# Patient Record
Sex: FEMALE | Race: WHITE | NOT HISPANIC OR LATINO | Employment: OTHER | URBAN - NONMETROPOLITAN AREA
[De-identification: names, ages, dates, MRNs, and addresses within clinical notes are randomized per-mention and may not be internally consistent; named-entity substitution may affect disease eponyms.]

---

## 2022-08-24 ENCOUNTER — OFFICE VISIT (OUTPATIENT)
Dept: CARDIOLOGY | Facility: CLINIC | Age: 67
End: 2022-08-24

## 2022-08-24 VITALS
OXYGEN SATURATION: 95 % | WEIGHT: 223 LBS | HEART RATE: 70 BPM | TEMPERATURE: 97.5 F | BODY MASS INDEX: 43.78 KG/M2 | DIASTOLIC BLOOD PRESSURE: 83 MMHG | SYSTOLIC BLOOD PRESSURE: 164 MMHG | HEIGHT: 60 IN

## 2022-08-24 DIAGNOSIS — R06.02 SHORTNESS OF BREATH: ICD-10-CM

## 2022-08-24 DIAGNOSIS — Z82.49 FAMILY HISTORY OF EARLY CAD: ICD-10-CM

## 2022-08-24 DIAGNOSIS — I10 PRIMARY HYPERTENSION: ICD-10-CM

## 2022-08-24 DIAGNOSIS — E78.5 HYPERLIPIDEMIA, UNSPECIFIED HYPERLIPIDEMIA TYPE: ICD-10-CM

## 2022-08-24 DIAGNOSIS — R07.89 OTHER CHEST PAIN: Primary | ICD-10-CM

## 2022-08-24 DIAGNOSIS — Z86.16 HISTORY OF COVID-19: ICD-10-CM

## 2022-08-24 PROBLEM — F41.9 ANXIETY: Status: ACTIVE | Noted: 2022-08-24

## 2022-08-24 PROBLEM — J30.2 SEASONAL ALLERGIES: Status: ACTIVE | Noted: 2022-08-24

## 2022-08-24 PROBLEM — K21.9 GERD (GASTROESOPHAGEAL REFLUX DISEASE): Status: ACTIVE | Noted: 2022-08-24

## 2022-08-24 PROBLEM — E11.9 DM (DIABETES MELLITUS), TYPE 2: Status: ACTIVE | Noted: 2022-08-24

## 2022-08-24 PROBLEM — J45.909 ASTHMA: Status: ACTIVE | Noted: 2022-08-24

## 2022-08-24 PROCEDURE — 99204 OFFICE O/P NEW MOD 45 MIN: CPT | Performed by: NURSE PRACTITIONER

## 2022-08-24 PROCEDURE — 93000 ELECTROCARDIOGRAM COMPLETE: CPT | Performed by: NURSE PRACTITIONER

## 2022-08-24 RX ORDER — HYDROCHLOROTHIAZIDE 25 MG/1
25 TABLET ORAL EVERY MORNING
COMMUNITY
Start: 2022-08-19

## 2022-08-24 RX ORDER — ATORVASTATIN CALCIUM 10 MG/1
10 TABLET, FILM COATED ORAL DAILY
COMMUNITY
Start: 2022-08-19

## 2022-08-24 RX ORDER — LEVOTHYROXINE SODIUM 0.03 MG/1
25 TABLET ORAL DAILY
COMMUNITY
Start: 2022-08-19

## 2022-08-24 RX ORDER — DAPAGLIFLOZIN 5 MG/1
5 TABLET, FILM COATED ORAL DAILY
COMMUNITY
Start: 2022-07-07 | End: 2023-01-18

## 2022-08-24 RX ORDER — OMEPRAZOLE 40 MG/1
40 CAPSULE, DELAYED RELEASE ORAL DAILY
COMMUNITY
Start: 2022-08-19

## 2022-08-24 RX ORDER — OXYBUTYNIN CHLORIDE 15 MG/1
15 TABLET, EXTENDED RELEASE ORAL DAILY
COMMUNITY
Start: 2022-05-26

## 2022-08-24 RX ORDER — BUSPIRONE HYDROCHLORIDE 10 MG/1
10 TABLET ORAL 2 TIMES DAILY
COMMUNITY
Start: 2022-08-19

## 2022-08-24 RX ORDER — PROPRANOLOL HYDROCHLORIDE 60 MG/1
120 TABLET ORAL DAILY
COMMUNITY
Start: 2022-08-19

## 2022-08-24 RX ORDER — ERGOCALCIFEROL 1.25 MG/1
50000 CAPSULE ORAL WEEKLY
COMMUNITY
Start: 2022-08-20

## 2022-08-24 RX ORDER — NITROGLYCERIN 0.4 MG/1
TABLET SUBLINGUAL
Qty: 30 TABLET | Refills: 5 | Status: SHIPPED | OUTPATIENT
Start: 2022-08-24

## 2022-08-24 NOTE — PROGRESS NOTES
Subjective   Lacey Lui is a 67 y.o. female     Chief Complaint   Patient presents with   • Hypertension   • Hyperlipidemia   • Chest Pain   • Establish Care       HPI    Problem List:    1. Chest Pain  2. Hyepertension  3. Hyperlipidemia   4. Shortness of breath  5. Diabetes mellitus II   6. Hypothyroidism   7. History of COVID-19 2021  8. Family history of early CAD, Mom 59    Patient is a 67-year-old female who presents today to establish care with her  at her side.  Patient says she had a couple of twinges about a month ago.  She says one was in the middle of her chest that was on the left anterior.  She states that came and went quickly and she had no other symptoms.  She said it did not radiate and she has not had any other episodes.  She denies any palpitations, fluttering, dizziness, presyncope, syncope, orthopnea or PND.  She does get swelling and she says usually her hydrochlorothiazide does help but she has not taken it yet today.  She does have shortness of breath but it really just depends on what she is doing.  She did have a history of COVID last September.  Patient feels like her chest pain is related to stress.  She says her daughter is getting , they have to move and currently they are also caring for their 14 and 16-year-old grandkids.    Occupation, cleans houses  She is never smoked, drink alcohol or do illicit drugs  She drinks at least 2 Cokes zeros a day and sometimes more; no coffee or tea    Mom 59 -MI, nondiabetic, non-smoker  Dad 86 -CHF, melanoma that mets and became septic   Sister 50s alive-MS, heart problems, leaky valve, diabetic  Brother 75 alive-stroke x2    We discussed having a stress test but patient does not feel like this is cardiac in nature.  She says she did see Dr. Valentine about 10 years ago and he told her was stress related.  She is willing to at least do a echo at this time.    We went over her labs.  Her creatinine was 0.98,  K4.1, A1c 6.8, LDL 67, triglycerides 169, mag 1.7 and TSH was normal.  Current Outpatient Medications on File Prior to Visit   Medication Sig Dispense Refill   • atorvastatin (LIPITOR) 10 MG tablet Take 10 mg by mouth Daily.     • busPIRone (BUSPAR) 10 MG tablet Take 10 mg by mouth 2 (Two) Times a Day.     • Farxiga 5 MG tablet tablet Take 5 mg by mouth Daily.     • Homeopathic Products (AZO YEAST PLUS PO) Take 1 tablet by mouth 3 (Three) Times a Day.     • hydroCHLOROthiazide (HYDRODIURIL) 25 MG tablet Take 25 mg by mouth Every Morning.     • levothyroxine (SYNTHROID, LEVOTHROID) 25 MCG tablet Take 25 mcg by mouth Daily. on an empty stomach     • omeprazole (priLOSEC) 40 MG capsule Take 40 mg by mouth Daily.     • oxybutynin XL (DITROPAN XL) 15 MG 24 hr tablet Take 15 mg by mouth Daily.     • propranolol (INDERAL) 60 MG tablet Take 120 mg by mouth Daily.     • vitamin D (ERGOCALCIFEROL) 1.25 MG (23456 UT) capsule capsule Take 50,000 Units by mouth 1 (One) Time Per Week. Sunday       No current facility-administered medications on file prior to visit.       ALLERGIES    Sulfa antibiotics    Past Medical History:   Diagnosis Date   • Bronchitis    • COVID-19     09/2021   • Diabetes mellitus (HCC)    • Hyperlipidemia    • Hypertension    • Loss of muscle tone of bladder    • Vitamin A deficiency        Social History     Socioeconomic History   • Marital status:    Tobacco Use   • Smoking status: Never Smoker   • Smokeless tobacco: Never Used   Vaping Use   • Vaping Use: Never used   Substance and Sexual Activity   • Alcohol use: Never   • Drug use: Defer   • Sexual activity: Defer       Family History   Problem Relation Age of Onset   • Heart disease Mother    • Hypertension Mother    • Heart attack Mother    • Hypertension Father    • Prostate cancer Father    • Skin cancer Father    • Glaucoma Father        Review of Systems   Constitutional: Negative for appetite change, chills, diaphoresis, fatigue and  "fever.   HENT: Positive for hearing loss (worse since COVID ) and rhinorrhea (after she eats ). Negative for congestion and sore throat.    Eyes: Positive for visual disturbance (glasses).   Respiratory: Positive for shortness of breath (some, depending on what she has been doing ). Negative for cough, chest tightness and wheezing.    Cardiovascular: Positive for chest pain (couple of twinges about 1 mos ago, one was mid and one was left anterior, came and went quickly; no rad, no other episodes, no other symptoms) and leg swelling (legs, ankles swelling will go down at night at times; does not always go down ). Negative for palpitations.   Gastrointestinal: Negative for abdominal pain, blood in stool, constipation, diarrhea, nausea and vomiting.   Endocrine: Positive for heat intolerance (night sweats ). Negative for cold intolerance.   Genitourinary: Positive for frequency (several times a day and night ) more in the day due to drinking alot of water and pop ) and urgency (hard to hold ). Negative for difficulty urinating, dysuria and hematuria.   Musculoskeletal: Positive for arthralgias (knees ) and joint swelling (knees ). Negative for back pain, neck pain and neck stiffness.   Skin: Negative for color change, pallor, rash and wound.   Allergic/Immunologic: Negative for environmental allergies and food allergies.   Neurological: Positive for numbness (skin ( burns at times since starting medication ) ). Negative for dizziness, syncope, weakness, light-headedness and headaches.   Hematological: Bruises/bleeds easily (Brusies easy ).   Psychiatric/Behavioral: Negative for sleep disturbance.       Objective   /83 (BP Location: Left arm, Patient Position: Sitting)   Pulse 70   Temp 97.5 °F (36.4 °C)   Ht 152.4 cm (60\")   Wt 101 kg (223 lb)   SpO2 95%   BMI 43.55 kg/m²   Vitals:    08/24/22 1532   BP: 164/83   BP Location: Left arm   Patient Position: Sitting   Pulse: 70   Temp: 97.5 °F (36.4 °C)   SpO2: " "95%   Weight: 101 kg (223 lb)   Height: 152.4 cm (60\")      Lab Results (most recent)     None        Physical Exam  Vitals reviewed.   Constitutional:       General: She is awake.      Appearance: Normal appearance. She is well-developed and well-groomed. She is morbidly obese.   HENT:      Head: Normocephalic.   Eyes:      General: Lids are normal.      Comments: Wears glasses   Neck:      Vascular: No carotid bruit, hepatojugular reflux or JVD.   Cardiovascular:      Rate and Rhythm: Normal rate and regular rhythm.      Pulses:           Radial pulses are 2+ on the right side and 2+ on the left side.        Dorsalis pedis pulses are 2+ on the right side and 2+ on the left side.        Posterior tibial pulses are 2+ on the right side and 2+ on the left side.      Heart sounds: Normal heart sounds.   Pulmonary:      Effort: Pulmonary effort is normal.      Breath sounds: Normal breath sounds and air entry.   Abdominal:      General: Bowel sounds are normal.      Palpations: Abdomen is soft.   Musculoskeletal:      Right lower leg: Edema (trace - 1+ ) present.      Left lower leg: Edema (trace -1+ ) present.   Skin:     General: Skin is warm and dry.   Neurological:      Mental Status: She is alert and oriented to person, place, and time.   Psychiatric:         Attention and Perception: Attention and perception normal.         Mood and Affect: Mood and affect normal.         Speech: Speech normal.         Behavior: Behavior normal. Behavior is cooperative.         Thought Content: Thought content normal.         Cognition and Memory: Memory normal.         Judgment: Judgment normal.         Procedure     ECG 12 Lead    Date/Time: 8/24/2022 3:53 PM  Performed by: Tiara Long APRN  Authorized by: Tiara Long APRN   Comparison: not compared with previous ECG   Rhythm: sinus rhythm  Rate: normal  BPM: 71  Conduction: 1st degree AV block  QRS axis: right  Other findings: low voltage and poor R wave " progression    Clinical impression: abnormal EKG                 Assessment & Plan      Diagnosis Plan   1. Other chest pain  ECG 12 Lead    Adult Transthoracic Echo Complete W/ Cont if Necessary Per Protocol    nitroglycerin (NITROSTAT) 0.4 MG SL tablet   2. Hyperlipidemia, unspecified hyperlipidemia type     3. Primary hypertension  ECG 12 Lead    Adult Transthoracic Echo Complete W/ Cont if Necessary Per Protocol   4. History of COVID-19  Adult Transthoracic Echo Complete W/ Cont if Necessary Per Protocol   5. Shortness of breath  Adult Transthoracic Echo Complete W/ Cont if Necessary Per Protocol   6. Family history of early CAD         Return in about 12 weeks (around 2022).    Chest pain/hypertension/history of COVID/shortness of breath-patient have an echocardiogram.  She will use nitro as needed for chest pain no resolution she will go to the ER.  Patient did not want to have a stress test at this time as she feels like is stress related.  She does have family history of early CAD and her mom  from MI at 59.  Hyperlipidemia-patient's on Lipitor and last LDL was 67.  Hypertension-patient is on propanolol and hydrochlorothiazide.  Her blood pressure is elevated today however at her PCP was 126/84.  She will continue her medication regimen.  She will follow-up in 12 weeks or sooner if any changes or abnormalities with testing.       Lacey Lui reports that she has never smoked or used any type of smokeless tobacco products . Advance Care Planning   ACP discussion was declined by the patient. Patient does not have an advance directive, declines further assistance. Patient brought in medicine list to appointment, it's been reviewed with patient and med list was updated in the chart.      Electronically signed by:

## 2022-08-25 ENCOUNTER — PATIENT ROUNDING (BHMG ONLY) (OUTPATIENT)
Dept: CARDIOLOGY | Facility: CLINIC | Age: 67
End: 2022-08-25

## 2022-08-25 NOTE — PROGRESS NOTES
August 25, 2022    Hello, may I speak with Lacey Lui?    My name is ERICA SUTHERLAND     I am  with [facility]  [address] [place] [id number] [phone].    Before we get started may I verify your date of birth? 1955    I am calling to officially welcome you to our practice and ask about your recent visit. Is this a good time to talk? No ANSWER ON MOBILE NUMBER.    WAS ABLE REACH PT ON HOME NUMBER.  YES    Tell me about your visit with us. What things went well?  EVERYTHING.  WE LOVE YOAN.  MY  SEE HER TOO.         We're always looking for ways to make our patients' experiences even better. Do you have recommendations on ways we may improve?  no, EVERYTHING WENT SMOOTH YESTERDAY. SHE WAS THOROUGH.      Overall were you satisfied with your first visit to our practice? yes       I appreciate you taking the time to speak with me today. Is there anything else I can do for you? No.        Thank you, and have a great day.

## 2022-10-03 ENCOUNTER — HOSPITAL ENCOUNTER (OUTPATIENT)
Dept: CARDIOLOGY | Facility: HOSPITAL | Age: 67
Discharge: HOME | End: 2022-10-03
Admitting: NURSE PRACTITIONER

## 2022-10-03 DIAGNOSIS — R06.02 SHORTNESS OF BREATH: ICD-10-CM

## 2022-10-03 DIAGNOSIS — I10 PRIMARY HYPERTENSION: ICD-10-CM

## 2022-10-03 DIAGNOSIS — R07.89 OTHER CHEST PAIN: ICD-10-CM

## 2022-10-03 DIAGNOSIS — Z86.16 HISTORY OF COVID-19: ICD-10-CM

## 2022-10-03 PROCEDURE — 93306 TTE W/DOPPLER COMPLETE: CPT | Performed by: INTERNAL MEDICINE

## 2022-10-03 PROCEDURE — 93306 TTE W/DOPPLER COMPLETE: CPT

## 2022-10-28 LAB
BH CV ECHO MEAS - ACS: 1.75 CM
BH CV ECHO MEAS - AO MAX PG: 6.2 MMHG
BH CV ECHO MEAS - AO MEAN PG: 4.2 MMHG
BH CV ECHO MEAS - AO ROOT DIAM: 2.7 CM
BH CV ECHO MEAS - AO V2 MAX: 124.9 CM/SEC
BH CV ECHO MEAS - AO V2 VTI: 34.8 CM
BH CV ECHO MEAS - EDV(CUBED): 91.7 ML
BH CV ECHO MEAS - EDV(MOD-SP4): 61.7 ML
BH CV ECHO MEAS - EF(MOD-SP4): 55.6 %
BH CV ECHO MEAS - EF_3D-VOL: 58 %
BH CV ECHO MEAS - ESV(CUBED): 41.8 ML
BH CV ECHO MEAS - ESV(MOD-SP4): 27.4 ML
BH CV ECHO MEAS - FS: 23.1 %
BH CV ECHO MEAS - IVS/LVPW: 0.84 CM
BH CV ECHO MEAS - IVSD: 0.95 CM
BH CV ECHO MEAS - LA DIMENSION: 3.8 CM
BH CV ECHO MEAS - LAT PEAK E' VEL: 7.8 CM/SEC
BH CV ECHO MEAS - LV DIASTOLIC VOL/BSA (35-75): 31.6 CM2
BH CV ECHO MEAS - LV MASS(C)D: 163.8 GRAMS
BH CV ECHO MEAS - LV SYSTOLIC VOL/BSA (12-30): 14 CM2
BH CV ECHO MEAS - LVIDD: 4.5 CM
BH CV ECHO MEAS - LVIDS: 3.5 CM
BH CV ECHO MEAS - LVOT AREA: 3.1 CM2
BH CV ECHO MEAS - LVOT DIAM: 1.97 CM
BH CV ECHO MEAS - LVPWD: 1.14 CM
BH CV ECHO MEAS - MED PEAK E' VEL: 4.6 CM/SEC
BH CV ECHO MEAS - MV A MAX VEL: 81 CM/SEC
BH CV ECHO MEAS - MV DEC SLOPE: 392.4 CM/SEC2
BH CV ECHO MEAS - MV E MAX VEL: 89.1 CM/SEC
BH CV ECHO MEAS - MV E/A: 1.1
BH CV ECHO MEAS - RVDD: 2.42 CM
BH CV ECHO MEAS - SI(MOD-SP4): 17.5 ML/M2
BH CV ECHO MEAS - SV(MOD-SP4): 34.3 ML
BH CV ECHO MEASUREMENTS AVERAGE E/E' RATIO: 14.37
LEFT ATRIUM VOLUME INDEX: 14.9 ML/M2
MAXIMAL PREDICTED HEART RATE: 155 BPM
STRESS TARGET HR: 132 BPM

## 2022-10-30 NOTE — PROGRESS NOTES
Patient's EF is on the lower side of normal.  Is patient willing to have a stress test?  If so can she walk on treadmill?  If so order [facility] [facility], if not then order [facility] . DX abnormal echo, CP, shortness of breath, cardiomyopathy.  Thanks.

## 2022-10-31 ENCOUNTER — TELEPHONE (OUTPATIENT)
Dept: CARDIOLOGY | Facility: CLINIC | Age: 67
End: 2022-10-31

## 2022-10-31 NOTE — TELEPHONE ENCOUNTER
----- Message from JUNIOR Rodriguez sent at 10/30/2022  7:23 PM EDT -----  Patient's EF is on the lower side of normal.  Is patient willing to have a stress test?  If so can she walk on treadmill?  If so order [facility] [facility], if not then order [facility] . DX abnormal echo, CP, shortness of breath, cardiomyopathy.  Thanks.         Pt was advised of the mess above regarding her Echo . Pt states that she is going to think about doing a stress test , she was advised of her up coming apt on 12/22/2022 she will then let JUNIOR Carey know at the visit if she is willing to proceed with testing . Pt also advised that if she changes her mind before the apt that she will let us know and we can order the stress test .    Echo -  ejection fraction of 45 to 50%.     NIK Roberts

## 2022-12-22 ENCOUNTER — OFFICE VISIT (OUTPATIENT)
Dept: CARDIOLOGY | Facility: CLINIC | Age: 67
End: 2022-12-22

## 2022-12-22 VITALS
HEIGHT: 60 IN | OXYGEN SATURATION: 98 % | HEART RATE: 74 BPM | SYSTOLIC BLOOD PRESSURE: 156 MMHG | WEIGHT: 214.2 LBS | BODY MASS INDEX: 42.05 KG/M2 | DIASTOLIC BLOOD PRESSURE: 88 MMHG | TEMPERATURE: 96.8 F

## 2022-12-22 DIAGNOSIS — R00.2 PALPITATIONS: ICD-10-CM

## 2022-12-22 DIAGNOSIS — R07.89 OTHER CHEST PAIN: Primary | ICD-10-CM

## 2022-12-22 DIAGNOSIS — Z86.16 HISTORY OF COVID-19: ICD-10-CM

## 2022-12-22 DIAGNOSIS — R06.02 SHORTNESS OF BREATH: ICD-10-CM

## 2022-12-22 DIAGNOSIS — I10 PRIMARY HYPERTENSION: ICD-10-CM

## 2022-12-22 DIAGNOSIS — E78.5 HYPERLIPIDEMIA, UNSPECIFIED HYPERLIPIDEMIA TYPE: ICD-10-CM

## 2022-12-22 PROCEDURE — 99214 OFFICE O/P EST MOD 30 MIN: CPT | Performed by: NURSE PRACTITIONER

## 2022-12-22 RX ORDER — LOSARTAN POTASSIUM 50 MG/1
50 TABLET ORAL DAILY
Qty: 90 TABLET | Refills: 3 | Status: SHIPPED | OUTPATIENT
Start: 2022-12-22

## 2022-12-22 NOTE — PROGRESS NOTES
Subjective   Lacey Lui is a 67 y.o. female     Chief Complaint   Patient presents with   • Follow-up     Echo   • Chest Pain     Not active   • Palpitations   • Headache   • Shortness of Breath       HPI    Problem List:    1. Chest Pain  2. Hyepertension  3. Hyperlipidemia   4. Shortness of breath  4.1 Echo 10/3/2022-EF 45 to 50%, diastolic dysfunction 2, trivial MR physiological TR  5. Diabetes mellitus II   6. Hypothyroidism   7. History of COVID-19 9/2021  8. Family history of early CAD, Mom 59    Patient is a 67-year-old female who presents today for follow-up on echo with her  at her side.  She has been having what she describes as a sharp pain midsternum that occurs usually whenever she is resting.  However she says it is after she has been active all day.  She says the last about 2 seconds but it does get her attention.  She says she will get very fatigued afterward.  She has no other symptoms and it does not radiate.  She says she had about 3-4 episodes since she was here last.  She does have palpitations on occasion but they are not very often.  She denies any dizziness, presyncope, syncope, orthopnea, PND or edema.  She has shortness of breath but only on occasion it just depends really on what she is doing.    We went over echo.    Current Outpatient Medications on File Prior to Visit   Medication Sig Dispense Refill   • atorvastatin (LIPITOR) 10 MG tablet Take 10 mg by mouth Daily.     • busPIRone (BUSPAR) 10 MG tablet Take 10 mg by mouth 2 (Two) Times a Day.     • Farxiga 5 MG tablet tablet Take 5 mg by mouth Daily.     • Homeopathic Products (AZO YEAST PLUS PO) Take 1 tablet by mouth 3 (Three) Times a Day.     • hydroCHLOROthiazide (HYDRODIURIL) 25 MG tablet Take 25 mg by mouth Every Morning.     • levothyroxine (SYNTHROID, LEVOTHROID) 25 MCG tablet Take 25 mcg by mouth Daily. on an empty stomach     • nitroglycerin (NITROSTAT) 0.4 MG SL tablet 1 under the tongue as needed for angina,  may repeat q5mins for up three doses 30 tablet 5   • omeprazole (priLOSEC) 40 MG capsule Take 40 mg by mouth Daily.     • oxybutynin XL (DITROPAN XL) 15 MG 24 hr tablet Take 15 mg by mouth Daily.     • propranolol (INDERAL) 60 MG tablet Take 120 mg by mouth Daily.     • vitamin D (ERGOCALCIFEROL) 1.25 MG (31529 UT) capsule capsule Take 50,000 Units by mouth 1 (One) Time Per Week. Sunday       No current facility-administered medications on file prior to visit.       ALLERGIES    Sulfa antibiotics    Past Medical History:   Diagnosis Date   • Bronchitis    • COVID-19     09/2021   • Diabetes mellitus (HCC)    • Hyperlipidemia    • Hypertension    • Loss of muscle tone of bladder    • Vitamin A deficiency        Social History     Socioeconomic History   • Marital status:    Tobacco Use   • Smoking status: Never   • Smokeless tobacco: Never   Vaping Use   • Vaping Use: Never used   Substance and Sexual Activity   • Alcohol use: Never   • Drug use: Defer   • Sexual activity: Defer       Family History   Problem Relation Age of Onset   • Heart disease Mother    • Hypertension Mother    • Heart attack Mother    • Hypertension Father    • Prostate cancer Father    • Skin cancer Father    • Glaucoma Father        Review of Systems   Constitutional: Positive for fatigue (after CP ). Negative for chills and fever.   HENT: Negative for congestion, dental problem, drooling, ear pain, hearing loss, mouth sores, nosebleeds, sinus pressure, sinus pain, sneezing, sore throat and tinnitus.    Eyes: Negative for pain, discharge, redness, itching and visual disturbance.   Respiratory: Positive for shortness of breath (sometimes depending on what she is doing ). Negative for cough, choking, chest tightness and wheezing.    Cardiovascular: Positive for chest pain (sharp pain mid for a sec or 2 then goes away; usually when resting, after being active all day; no rad; since here last 3 -4 x  ) and palpitations (not very often ).  "Negative for leg swelling.   Gastrointestinal: Positive for constipation. Negative for abdominal pain, blood in stool, diarrhea, nausea and vomiting.   Endocrine: Negative for cold intolerance.   Genitourinary: Negative for difficulty urinating and hematuria.   Musculoskeletal: Negative for back pain, joint swelling and neck pain.   Skin: Negative.    Allergic/Immunologic: Positive for environmental allergies.   Neurological: Positive for headaches. Negative for dizziness, seizures, syncope, weakness, light-headedness and numbness.   Hematological: Does not bruise/bleed easily.   Psychiatric/Behavioral: Positive for sleep disturbance (Pt states she sleeps restfully 4/5 hours a night).       Objective   /88 (BP Location: Left arm, Patient Position: Sitting, Cuff Size: Adult)   Pulse 74   Temp 96.8 °F (36 °C) (Temporal)   Ht 152.4 cm (60\")   Wt 97.2 kg (214 lb 3.2 oz)   SpO2 98%   BMI 41.83 kg/m²   Vitals:    12/22/22 1318   BP: 156/88   BP Location: Left arm   Patient Position: Sitting   Cuff Size: Adult   Pulse: 74   Temp: 96.8 °F (36 °C)   TempSrc: Temporal   SpO2: 98%   Weight: 97.2 kg (214 lb 3.2 oz)   Height: 152.4 cm (60\")      Lab Results (most recent)     None        Physical Exam  Vitals reviewed.   Constitutional:       General: She is awake.      Appearance: Normal appearance. She is well-developed and well-groomed. She is morbidly obese.   HENT:      Head: Normocephalic.   Eyes:      General: Lids are normal.   Neck:      Vascular: No carotid bruit, hepatojugular reflux or JVD.   Cardiovascular:      Rate and Rhythm: Normal rate and regular rhythm.      Pulses:           Radial pulses are 2+ on the right side and 2+ on the left side.        Dorsalis pedis pulses are 2+ on the right side and 2+ on the left side.        Posterior tibial pulses are 2+ on the right side and 2+ on the left side.      Heart sounds: Normal heart sounds.   Pulmonary:      Effort: Pulmonary effort is normal.      " Breath sounds: Normal breath sounds and air entry.   Abdominal:      General: Bowel sounds are normal.      Palpations: Abdomen is soft.   Musculoskeletal:      Right lower leg: No edema.      Left lower leg: No edema.   Skin:     General: Skin is warm and dry.   Neurological:      Mental Status: She is alert and oriented to person, place, and time.   Psychiatric:         Attention and Perception: Attention and perception normal.         Mood and Affect: Mood and affect normal.         Speech: Speech normal.         Behavior: Behavior normal. Behavior is cooperative.         Thought Content: Thought content normal.         Cognition and Memory: Cognition and memory normal.         Judgment: Judgment normal.         Procedure   Procedures         Assessment & Plan      Diagnosis Plan   1. Other chest pain  Stress Test With Myocardial Perfusion One Day      2. Primary hypertension  losartan (Cozaar) 50 MG tablet    Basic Metabolic Panel    Stress Test With Myocardial Perfusion One Day      3. Hyperlipidemia, unspecified hyperlipidemia type  Stress Test With Myocardial Perfusion One Day      4. Shortness of breath  Stress Test With Myocardial Perfusion One Day      5. History of COVID-19  Stress Test With Myocardial Perfusion One Day      6. Palpitations  Stress Test With Myocardial Perfusion One Day          Return in about 12 weeks (around 3/16/2023).    Chest pain/hypertension/hyperlipidemia/shortness of breath/history of COVID/palpitations-patient have a Lexiscan stress test.  She was encouraged use nitro as needed for chest pain no resolution to go to the ER.  Hypertension-patient will add losartan to her medication regimen and she will take it in the evening.  She will get a BMP when she comes back for her stress test to reassess her kidney function.  She will continue her medication regimen with above-noted change.  She will follow-up in 12 weeks or sooner if any changes or abnormalities with testing.        Lacey Hu  reports that she has never smoked. She has never used smokeless tobacco..     Electronically signed by:

## 2023-01-13 ENCOUNTER — HOSPITAL ENCOUNTER (OUTPATIENT)
Dept: CARDIOLOGY | Facility: HOSPITAL | Age: 68
Discharge: HOME | End: 2023-01-13
Payer: MEDICARE

## 2023-01-13 DIAGNOSIS — Z86.16 HISTORY OF COVID-19: ICD-10-CM

## 2023-01-13 DIAGNOSIS — R06.02 SHORTNESS OF BREATH: ICD-10-CM

## 2023-01-13 DIAGNOSIS — E78.5 HYPERLIPIDEMIA, UNSPECIFIED HYPERLIPIDEMIA TYPE: ICD-10-CM

## 2023-01-13 DIAGNOSIS — R07.89 OTHER CHEST PAIN: ICD-10-CM

## 2023-01-13 DIAGNOSIS — R00.2 PALPITATIONS: ICD-10-CM

## 2023-01-13 DIAGNOSIS — I10 PRIMARY HYPERTENSION: ICD-10-CM

## 2023-01-13 PROCEDURE — A9500 TC99M SESTAMIBI: HCPCS | Performed by: INTERNAL MEDICINE

## 2023-01-13 PROCEDURE — 0 TECHNETIUM SESTAMIBI: Performed by: INTERNAL MEDICINE

## 2023-01-13 PROCEDURE — 78452 HT MUSCLE IMAGE SPECT MULT: CPT | Performed by: INTERNAL MEDICINE

## 2023-01-13 PROCEDURE — 78452 HT MUSCLE IMAGE SPECT MULT: CPT

## 2023-01-13 PROCEDURE — 93018 CV STRESS TEST I&R ONLY: CPT | Performed by: INTERNAL MEDICINE

## 2023-01-13 PROCEDURE — 25010000002 REGADENOSON 0.4 MG/5ML SOLUTION: Performed by: INTERNAL MEDICINE

## 2023-01-13 PROCEDURE — 93017 CV STRESS TEST TRACING ONLY: CPT

## 2023-01-13 RX ADMIN — REGADENOSON 0.4 MG: 0.08 INJECTION, SOLUTION INTRAVENOUS at 09:42

## 2023-01-13 RX ADMIN — TECHNETIUM TC 99M SESTAMIBI 1 DOSE: 1 INJECTION INTRAVENOUS at 09:42

## 2023-01-13 RX ADMIN — TECHNETIUM TC 99M SESTAMIBI 1 DOSE: 1 INJECTION INTRAVENOUS at 08:08

## 2023-01-17 LAB
BH CV REST NUCLEAR ISOTOPE DOSE: 10 MCI
BH CV STRESS COMMENTS STAGE 1: NORMAL
BH CV STRESS DOSE REGADENOSON STAGE 1: 0.4
BH CV STRESS DURATION MIN STAGE 1: 0
BH CV STRESS DURATION SEC STAGE 1: 10
BH CV STRESS NUCLEAR ISOTOPE DOSE: 30 MCI
BH CV STRESS PROTOCOL 1: NORMAL
BH CV STRESS RECOVERY BP: NORMAL MMHG
BH CV STRESS RECOVERY HR: 86 BPM
BH CV STRESS STAGE 1: 1
MAXIMAL PREDICTED HEART RATE: 155 BPM
PERCENT MAX PREDICTED HR: 65.81 %
STRESS BASELINE BP: NORMAL MMHG
STRESS BASELINE HR: 68 BPM
STRESS PERCENT HR: 77 %
STRESS POST PEAK BP: NORMAL MMHG
STRESS POST PEAK HR: 102 BPM
STRESS TARGET HR: 132 BPM

## 2023-01-18 ENCOUNTER — OFFICE VISIT (OUTPATIENT)
Dept: CARDIOLOGY | Facility: CLINIC | Age: 68
End: 2023-01-18
Payer: MEDICARE

## 2023-01-18 VITALS
HEART RATE: 88 BPM | OXYGEN SATURATION: 93 % | WEIGHT: 213 LBS | TEMPERATURE: 96.6 F | DIASTOLIC BLOOD PRESSURE: 92 MMHG | SYSTOLIC BLOOD PRESSURE: 132 MMHG | HEIGHT: 60 IN | BODY MASS INDEX: 41.82 KG/M2

## 2023-01-18 DIAGNOSIS — R06.02 SHORTNESS OF BREATH: ICD-10-CM

## 2023-01-18 DIAGNOSIS — R94.39 ABNORMAL STRESS TEST: Primary | ICD-10-CM

## 2023-01-18 DIAGNOSIS — I10 PRIMARY HYPERTENSION: ICD-10-CM

## 2023-01-18 DIAGNOSIS — Z82.49 FAMILY HISTORY OF EARLY CAD: ICD-10-CM

## 2023-01-18 DIAGNOSIS — E78.5 HYPERLIPIDEMIA, UNSPECIFIED HYPERLIPIDEMIA TYPE: ICD-10-CM

## 2023-01-18 DIAGNOSIS — Z86.16 HISTORY OF COVID-19: ICD-10-CM

## 2023-01-18 PROCEDURE — 99214 OFFICE O/P EST MOD 30 MIN: CPT | Performed by: NURSE PRACTITIONER

## 2023-01-18 RX ORDER — ASPIRIN 81 MG/1
81 TABLET ORAL DAILY
Qty: 90 TABLET | Refills: 3 | Status: SHIPPED | OUTPATIENT
Start: 2023-01-18

## 2023-01-18 RX ORDER — CETIRIZINE HYDROCHLORIDE 5 MG/1
5 TABLET ORAL DAILY
COMMUNITY

## 2023-01-18 NOTE — LETTER
2023     JUNIOR Montano  [address]  [address]  [place] [address]    Patient: Lacey Lui   YOB: 1955   Date of Visit: 2023       Dear JUNIOR Kay:    Thank you for referring Lacey Lui to me for evaluation. Below are the relevant portions of my assessment and plan of care.    If you have questions, please do not hesitate to call me. I look forward to following Lacey along with you.         Sincerely,        JUNIOR Armando        CC: No Recipients  Tiara Olguin APRN  [id number]  Signed  Subjective    Lacey Lui is a 67 y.o. female     Chief Complaint   Patient presents with   • Follow-up       HPI    Problem List:    1. Chest Pain  1.1 stress test 2023-small, moderate dense reversible defect involving the distal anteroseptal wall and apex compatible with ischemia, EF 52% with septal hypokinesis  2. Hyepertension  3. Hyperlipidemia   4. Shortness of breath  4.1 Echo 10/3/2022-EF 45 to 50%, diastolic dysfunction 2, trivial MR physiological TR  5. Diabetes mellitus II   6. Hypothyroidism   7. History of COVID-19 2021  8. Family history of early CAD, Mom 59    Patient is a 67-year-old female who presents today for follow-up on stress test with her  at her side.  She denies any further chest pain, pressure, palpitations, fluttering, dizziness, presyncope, syncope, orthopnea or PND.  She does get some swelling but she says it goes down overnight.  She denies any shortness of breath with activity.  She says since I changed her medications her fatigue is a lot better as well.    We went over stress test and her risk.  She has a history of diabetes and her mom  at 59 from coronary artery disease.  Her  and myself both highly recommended her proceed with left heart cath due to findings on her stress test however patient does not want to do it at this time.  She says she knows her body and she actually feels better.  I  did advise her if she changes her mind at all between now and when she comes back just to call us and we will get her scheduled for the cath.      Current Outpatient Medications on File Prior to Visit   Medication Sig Dispense Refill   • atorvastatin (LIPITOR) 10 MG tablet Take 10 mg by mouth Daily.     • busPIRone (BUSPAR) 10 MG tablet Take 10 mg by mouth 2 (Two) Times a Day.     • cetirizine (zyrTEC) 5 MG tablet Take 5 mg by mouth Daily.     • Dulaglutide (TRULICITY SC) Inject 0.75 mg under the skin into the appropriate area as directed 1 (One) Time Per Week.     • hydroCHLOROthiazide (HYDRODIURIL) 25 MG tablet Take 25 mg by mouth Every Morning.     • levothyroxine (SYNTHROID, LEVOTHROID) 25 MCG tablet Take 25 mcg by mouth Daily. on an empty stomach     • losartan (Cozaar) 50 MG tablet Take 1 tablet by mouth Daily. 90 tablet 3   • nitroglycerin (NITROSTAT) 0.4 MG SL tablet 1 under the tongue as needed for angina, may repeat q5mins for up three doses 30 tablet 5   • omeprazole (priLOSEC) 40 MG capsule Take 40 mg by mouth Daily.     • oxybutynin XL (DITROPAN XL) 15 MG 24 hr tablet Take 15 mg by mouth Daily.     • propranolol (INDERAL) 60 MG tablet Take 120 mg by mouth Daily.     • vitamin D (ERGOCALCIFEROL) 1.25 MG (31048 UT) capsule capsule Take 50,000 Units by mouth 1 (One) Time Per Week. Sunday     • [DISCONTINUED] Farxiga 5 MG tablet tablet Take 5 mg by mouth Daily.     • [DISCONTINUED] Homeopathic Products (AZO YEAST PLUS PO) Take 1 tablet by mouth 3 (Three) Times a Day.       No current facility-administered medications on file prior to visit.       ALLERGIES    Sulfa antibiotics    Past Medical History:   Diagnosis Date   • Bronchitis    • COVID-19     09/2021   • Diabetes mellitus (HCC)    • Hyperlipidemia    • Hypertension    • Loss of muscle tone of bladder    • Vitamin A deficiency        Social History     Socioeconomic History   • Marital status:    Tobacco Use   • Smoking status: Never   •  "Smokeless tobacco: Never   Vaping Use   • Vaping Use: Never used   Substance and Sexual Activity   • Alcohol use: Never   • Drug use: Defer   • Sexual activity: Defer       Family History   Problem Relation Age of Onset   • Heart disease Mother    • Hypertension Mother    • Heart attack Mother    • Hypertension Father    • Prostate cancer Father    • Skin cancer Father    • Glaucoma Father        Review of Systems   Constitutional: Negative for appetite change, chills, diaphoresis, fatigue (much better with medication change ) and fever.   HENT: Negative for congestion, rhinorrhea and sore throat.    Eyes: Positive for visual disturbance (glasses).   Respiratory: Negative for cough, chest tightness, shortness of breath and wheezing.    Cardiovascular: Positive for leg swelling (legs, feet and ankles swelling will go down at night ). Negative for chest pain and palpitations.   Gastrointestinal: Positive for constipation (from time to time ). Negative for abdominal pain, blood in stool, diarrhea, nausea and vomiting.   Endocrine: Negative for cold intolerance and heat intolerance.   Genitourinary: Positive for frequency (several times a day depends on how much she drinks ). Negative for difficulty urinating, dysuria, hematuria and urgency.   Musculoskeletal: Negative for arthralgias, back pain, joint swelling, neck pain and neck stiffness.   Skin: Negative for color change, pallor, rash and wound.   Allergic/Immunologic: Negative for environmental allergies and food allergies.   Neurological: Negative for dizziness, syncope, weakness, light-headedness, numbness and headaches.   Hematological: Does not bruise/bleed easily.   Psychiatric/Behavioral: Negative for sleep disturbance.       Objective    /92 (BP Location: Left arm, Patient Position: Sitting)   Pulse 88   Temp 96.6 °F (35.9 °C)   Ht 152.4 cm (60\")   Wt 96.6 kg (213 lb)   SpO2 93%   BMI 41.60 kg/m²   Vitals:    01/18/23 0921   BP: 132/92   BP " "Location: Left arm   Patient Position: Sitting   Pulse: 88   Temp: 96.6 °F (35.9 °C)   SpO2: 93%   Weight: 96.6 kg (213 lb)   Height: 152.4 cm (60\")      Lab Results (most recent)     None        Physical Exam  Vitals reviewed.   Constitutional:       General: She is awake.      Appearance: Normal appearance. She is well-developed and well-groomed. She is morbidly obese.   HENT:      Head: Normocephalic.   Eyes:      General: Lids are normal.      Comments: Wears glasses    Neck:      Vascular: No carotid bruit, hepatojugular reflux or JVD.   Cardiovascular:      Rate and Rhythm: Normal rate and regular rhythm.      Pulses:           Radial pulses are 2+ on the right side and 2+ on the left side.        Dorsalis pedis pulses are 2+ on the right side and 2+ on the left side.        Posterior tibial pulses are 2+ on the right side and 2+ on the left side.      Heart sounds: Normal heart sounds.   Pulmonary:      Effort: Pulmonary effort is normal.      Breath sounds: Normal breath sounds and air entry.   Abdominal:      General: Bowel sounds are normal.      Palpations: Abdomen is soft.   Musculoskeletal:      Right lower leg: No edema.      Left lower leg: No edema.   Skin:     General: Skin is warm and dry.   Neurological:      Mental Status: She is alert and oriented to person, place, and time.   Psychiatric:         Attention and Perception: Attention and perception normal.         Mood and Affect: Mood and affect normal.         Speech: Speech normal.         Behavior: Behavior normal. Behavior is cooperative.         Thought Content: Thought content normal.         Cognition and Memory: Cognition and memory normal.         Judgment: Judgment normal.         Procedure    Procedures        Assessment & Plan      Diagnosis Plan   1. Abnormal stress test  aspirin 81 MG EC tablet      2. Primary hypertension        3. Hyperlipidemia, unspecified hyperlipidemia type        4. Family history of early CAD        5. " Shortness of breath        6. History of COVID-19            Return in 3 months (on 2023).    Abnormal stress test/family history of early CAD-patient will start aspirin 81.  She does not want to have any additional work-up at this time.  Her  and I both really tried to encourage her to proceed with heart cath due to her diabetes and mom  at 59 from heart disease.  Patient still refused and wishes to wait as she feels like she is doing well.  She was encouraged to call if anything changes and she decide she would like to proceed with heart cath.  Hypertension-patient is on hydrochlorothiazide, losartan and propanolol and doing well.  Hyperlipidemia-patient's on Lipitor.  Shortness of breath-Per patient has resolved.  History of COVID-19-patient stable.  She will continue her medication regimen with the exception of starting aspirin.  She will follow-up in 3 months or sooner should she decide to proceed with left heart cath.      Lacey Lui  reports that she has never smoked. She has never used smokeless tobacco..  Patient brought in medicine list to appointment, it's been reviewed with patient and med list was updated in the chart.     Advance Care Planning   ACP discussion was declined by the patient. Patient does not have an advance directive, declines further assistance.      Electronically signed by:

## 2023-01-18 NOTE — PROGRESS NOTES
Subjective   Lacey Lui is a 67 y.o. female     Chief Complaint   Patient presents with   • Follow-up       HPI    Problem List:    1. Chest Pain  1.1 stress test 2023-small, moderate dense reversible defect involving the distal anteroseptal wall and apex compatible with ischemia, EF 52% with septal hypokinesis  2. Hyepertension  3. Hyperlipidemia   4. Shortness of breath  4.1 Echo 10/3/2022-EF 45 to 50%, diastolic dysfunction 2, trivial MR physiological TR  5. Diabetes mellitus II   6. Hypothyroidism   7. History of COVID-19 2021  8. Family history of early CAD, Mom 59    Patient is a 67-year-old female who presents today for follow-up on stress test with her  at her side.  She denies any further chest pain, pressure, palpitations, fluttering, dizziness, presyncope, syncope, orthopnea or PND.  She does get some swelling but she says it goes down overnight.  She denies any shortness of breath with activity.  She says since I changed her medications her fatigue is a lot better as well.    We went over stress test and her risk.  She has a history of diabetes and her mom  at 59 from coronary artery disease.  Her  and myself both highly recommended her proceed with left heart cath due to findings on her stress test however patient does not want to do it at this time.  She says she knows her body and she actually feels better.  I did advise her if she changes her mind at all between now and when she comes back just to call us and we will get her scheduled for the cath.      Current Outpatient Medications on File Prior to Visit   Medication Sig Dispense Refill   • atorvastatin (LIPITOR) 10 MG tablet Take 10 mg by mouth Daily.     • busPIRone (BUSPAR) 10 MG tablet Take 10 mg by mouth 2 (Two) Times a Day.     • cetirizine (zyrTEC) 5 MG tablet Take 5 mg by mouth Daily.     • Dulaglutide (TRULICITY SC) Inject 0.75 mg under the skin into the appropriate area as directed 1 (One) Time Per Week.      • hydroCHLOROthiazide (HYDRODIURIL) 25 MG tablet Take 25 mg by mouth Every Morning.     • levothyroxine (SYNTHROID, LEVOTHROID) 25 MCG tablet Take 25 mcg by mouth Daily. on an empty stomach     • losartan (Cozaar) 50 MG tablet Take 1 tablet by mouth Daily. 90 tablet 3   • nitroglycerin (NITROSTAT) 0.4 MG SL tablet 1 under the tongue as needed for angina, may repeat q5mins for up three doses 30 tablet 5   • omeprazole (priLOSEC) 40 MG capsule Take 40 mg by mouth Daily.     • oxybutynin XL (DITROPAN XL) 15 MG 24 hr tablet Take 15 mg by mouth Daily.     • propranolol (INDERAL) 60 MG tablet Take 120 mg by mouth Daily.     • vitamin D (ERGOCALCIFEROL) 1.25 MG (95136 UT) capsule capsule Take 50,000 Units by mouth 1 (One) Time Per Week. Sunday     • [DISCONTINUED] Farxiga 5 MG tablet tablet Take 5 mg by mouth Daily.     • [DISCONTINUED] Homeopathic Products (AZO YEAST PLUS PO) Take 1 tablet by mouth 3 (Three) Times a Day.       No current facility-administered medications on file prior to visit.       ALLERGIES    Sulfa antibiotics    Past Medical History:   Diagnosis Date   • Bronchitis    • COVID-19     09/2021   • Diabetes mellitus (HCC)    • Hyperlipidemia    • Hypertension    • Loss of muscle tone of bladder    • Vitamin A deficiency        Social History     Socioeconomic History   • Marital status:    Tobacco Use   • Smoking status: Never   • Smokeless tobacco: Never   Vaping Use   • Vaping Use: Never used   Substance and Sexual Activity   • Alcohol use: Never   • Drug use: Defer   • Sexual activity: Defer       Family History   Problem Relation Age of Onset   • Heart disease Mother    • Hypertension Mother    • Heart attack Mother    • Hypertension Father    • Prostate cancer Father    • Skin cancer Father    • Glaucoma Father        Review of Systems   Constitutional: Negative for appetite change, chills, diaphoresis, fatigue (much better with medication change ) and fever.   HENT: Negative for  "congestion, rhinorrhea and sore throat.    Eyes: Positive for visual disturbance (glasses).   Respiratory: Negative for cough, chest tightness, shortness of breath and wheezing.    Cardiovascular: Positive for leg swelling (legs, feet and ankles swelling will go down at night ). Negative for chest pain and palpitations.   Gastrointestinal: Positive for constipation (from time to time ). Negative for abdominal pain, blood in stool, diarrhea, nausea and vomiting.   Endocrine: Negative for cold intolerance and heat intolerance.   Genitourinary: Positive for frequency (several times a day depends on how much she drinks ). Negative for difficulty urinating, dysuria, hematuria and urgency.   Musculoskeletal: Negative for arthralgias, back pain, joint swelling, neck pain and neck stiffness.   Skin: Negative for color change, pallor, rash and wound.   Allergic/Immunologic: Negative for environmental allergies and food allergies.   Neurological: Negative for dizziness, syncope, weakness, light-headedness, numbness and headaches.   Hematological: Does not bruise/bleed easily.   Psychiatric/Behavioral: Negative for sleep disturbance.       Objective   /92 (BP Location: Left arm, Patient Position: Sitting)   Pulse 88   Temp 96.6 °F (35.9 °C)   Ht 152.4 cm (60\")   Wt 96.6 kg (213 lb)   SpO2 93%   BMI 41.60 kg/m²   Vitals:    01/18/23 0921   BP: 132/92   BP Location: Left arm   Patient Position: Sitting   Pulse: 88   Temp: 96.6 °F (35.9 °C)   SpO2: 93%   Weight: 96.6 kg (213 lb)   Height: 152.4 cm (60\")      Lab Results (most recent)     None        Physical Exam  Vitals reviewed.   Constitutional:       General: She is awake.      Appearance: Normal appearance. She is well-developed and well-groomed. She is morbidly obese.   HENT:      Head: Normocephalic.   Eyes:      General: Lids are normal.      Comments: Wears glasses    Neck:      Vascular: No carotid bruit, hepatojugular reflux or JVD.   Cardiovascular:      " Rate and Rhythm: Normal rate and regular rhythm.      Pulses:           Radial pulses are 2+ on the right side and 2+ on the left side.        Dorsalis pedis pulses are 2+ on the right side and 2+ on the left side.        Posterior tibial pulses are 2+ on the right side and 2+ on the left side.      Heart sounds: Normal heart sounds.   Pulmonary:      Effort: Pulmonary effort is normal.      Breath sounds: Normal breath sounds and air entry.   Abdominal:      General: Bowel sounds are normal.      Palpations: Abdomen is soft.   Musculoskeletal:      Right lower leg: No edema.      Left lower leg: No edema.   Skin:     General: Skin is warm and dry.   Neurological:      Mental Status: She is alert and oriented to person, place, and time.   Psychiatric:         Attention and Perception: Attention and perception normal.         Mood and Affect: Mood and affect normal.         Speech: Speech normal.         Behavior: Behavior normal. Behavior is cooperative.         Thought Content: Thought content normal.         Cognition and Memory: Cognition and memory normal.         Judgment: Judgment normal.         Procedure   Procedures         Assessment & Plan      Diagnosis Plan   1. Abnormal stress test  aspirin 81 MG EC tablet      2. Primary hypertension        3. Hyperlipidemia, unspecified hyperlipidemia type        4. Family history of early CAD        5. Shortness of breath        6. History of COVID-19            Return in 3 months (on 2023).    Abnormal stress test/family history of early CAD-patient will start aspirin 81.  She does not want to have any additional work-up at this time.  Her  and I both really tried to encourage her to proceed with heart cath due to her diabetes and mom  at 59 from heart disease.  Patient still refused and wishes to wait as she feels like she is doing well.  She was encouraged to call if anything changes and she decide she would like to proceed with heart cath.   Hypertension-patient is on hydrochlorothiazide, losartan and propanolol and doing well.  Hyperlipidemia-patient's on Lipitor.  Shortness of breath-Per patient has resolved.  History of COVID-19-patient stable.  She will continue her medication regimen with the exception of starting aspirin.  She will follow-up in 3 months or sooner should she decide to proceed with left heart cath.     Patient called back and wants to proceed with LHC, due to BMI not candidate for CTA heart.  No med holds and no contrast allergies.  Orders placed.  She will come in for nurse visit for instructions.   Lacey Lui  reports that she has never smoked. She has never used smokeless tobacco..  Patient brought in medicine list to appointment, it's been reviewed with patient and med list was updated in the chart.     Advance Care Planning   ACP discussion was declined by the patient. Patient does not have an advance directive, declines further assistance.       Electronically signed by:

## 2023-02-03 ENCOUNTER — TELEPHONE (OUTPATIENT)
Dept: CARDIOLOGY | Facility: CLINIC | Age: 68
End: 2023-02-03

## 2023-02-03 NOTE — TELEPHONE ENCOUNTER
Caller: Lacey Lui    Relationship: Self    Best call back number: [phone]    What is the best time to reach you: ANYTIME    What was the call regarding: PATIENT WAS IN T SEE [facility] ON 01/18/23 AND STATED SHE MENTIONED DOING A HEART CATH. PATIENT WOULD LIKE TO PROCEED WITH SETTING THIS UP TO SCHEDULE     Do you require a callback: YES

## 2023-02-09 ENCOUNTER — LAB (OUTPATIENT)
Dept: CARDIOLOGY | Facility: CLINIC | Age: 68
End: 2023-02-09
Payer: MEDICARE

## 2023-02-09 ENCOUNTER — TELEPHONE (OUTPATIENT)
Dept: CARDIOLOGY | Facility: CLINIC | Age: 68
End: 2023-02-09
Payer: MEDICARE

## 2023-02-09 DIAGNOSIS — I10 PRIMARY HYPERTENSION: ICD-10-CM

## 2023-02-09 LAB
ANION GAP SERPL CALCULATED.3IONS-SCNC: 13.4 MMOL/L (ref 5–15)
BUN SERPL-MCNC: 13 MG/DL (ref 8–23)
BUN/CREAT SERPL: 13.5 (ref 7–25)
CALCIUM SPEC-SCNC: 9.3 MG/DL (ref 8.6–10.5)
CHLORIDE SERPL-SCNC: 99 MMOL/L (ref 98–107)
CO2 SERPL-SCNC: 24.6 MMOL/L (ref 22–29)
CREAT SERPL-MCNC: 0.96 MG/DL (ref 0.57–1)
DEPRECATED RDW RBC AUTO: 44.5 FL (ref 37–54)
EGFRCR SERPLBLD CKD-EPI 2021: 65.4 ML/MIN/1.73
ERYTHROCYTE [DISTWIDTH] IN BLOOD BY AUTOMATED COUNT: 13.9 % (ref 12.3–15.4)
GLUCOSE SERPL-MCNC: 124 MG/DL (ref 65–99)
HCT VFR BLD AUTO: 42.9 % (ref 34–46.6)
HGB BLD-MCNC: 13.6 G/DL (ref 12–15.9)
MCH RBC QN AUTO: 27.6 PG (ref 26.6–33)
MCHC RBC AUTO-ENTMCNC: 31.7 G/DL (ref 31.5–35.7)
MCV RBC AUTO: 87.2 FL (ref 79–97)
PLATELET # BLD AUTO: 302 10*3/MM3 (ref 140–450)
PMV BLD AUTO: 10.7 FL (ref 6–12)
POTASSIUM SERPL-SCNC: 4.2 MMOL/L (ref 3.5–5.2)
RBC # BLD AUTO: 4.92 10*6/MM3 (ref 3.77–5.28)
SODIUM SERPL-SCNC: 137 MMOL/L (ref 136–145)
WBC NRBC COR # BLD: 9.33 10*3/MM3 (ref 3.4–10.8)

## 2023-02-09 PROCEDURE — 85027 COMPLETE CBC AUTOMATED: CPT | Performed by: NURSE PRACTITIONER

## 2023-02-09 PROCEDURE — 80048 BASIC METABOLIC PNL TOTAL CA: CPT | Performed by: NURSE PRACTITIONER

## 2023-02-09 NOTE — TELEPHONE ENCOUNTER
Pt presented in office to review cath instructions, I gave she and her  the below info.     Cath date: 3/3/23    Arrival time: 8am  Note: Cath isn't scheduled to start until 10am, but you need to be two hours early, so arrive at 8am.       Please review your cardiac cath instructions, it goes over what to expect upon arrival at the hospital, shaving, etc.   Eat a light breakfast, nothing heavy or greasy. Things like oatmeal, cereal, scrambled eggs, or toast are safe to have.   Take all of your medications like you do normally.  Pt is getting labs drawn today. If your platelets are too low or your kidney function is too high, I will call you, as it will need to be addressed.     Confirmed w/ pt that she has a sulfa abx allergy, but has no issue w/ contrast dye.        Follow up for after cath:4/12/23    If you have any questions, please call our office.           NIK Morrison

## 2023-02-10 ENCOUNTER — TELEPHONE (OUTPATIENT)
Dept: CARDIOLOGY | Facility: CLINIC | Age: 68
End: 2023-02-10
Payer: MEDICARE

## 2023-03-03 ENCOUNTER — OUTSIDE FACILITY SERVICE (OUTPATIENT)
Dept: CARDIOLOGY | Facility: CLINIC | Age: 68
End: 2023-03-03
Payer: MEDICARE

## 2023-03-03 DIAGNOSIS — R94.39 ABNORMAL STRESS TEST: ICD-10-CM

## 2023-03-03 DIAGNOSIS — R06.02 SHORTNESS OF BREATH: ICD-10-CM

## 2023-03-03 DIAGNOSIS — Z82.49 FAMILY HISTORY OF EARLY CAD: ICD-10-CM

## 2023-03-03 DIAGNOSIS — E78.5 HYPERLIPIDEMIA, UNSPECIFIED HYPERLIPIDEMIA TYPE: ICD-10-CM

## 2023-03-03 DIAGNOSIS — I10 PRIMARY HYPERTENSION: ICD-10-CM

## 2023-03-03 DIAGNOSIS — Z86.16 HISTORY OF COVID-19: ICD-10-CM

## 2023-03-03 PROCEDURE — 93458 L HRT ARTERY/VENTRICLE ANGIO: CPT | Performed by: INTERNAL MEDICINE

## 2023-03-27 ENCOUNTER — OFFICE VISIT (OUTPATIENT)
Dept: CARDIOLOGY | Facility: CLINIC | Age: 68
End: 2023-03-27
Payer: MEDICARE

## 2023-03-27 VITALS
SYSTOLIC BLOOD PRESSURE: 130 MMHG | HEART RATE: 80 BPM | HEIGHT: 60 IN | OXYGEN SATURATION: 94 % | WEIGHT: 208.8 LBS | DIASTOLIC BLOOD PRESSURE: 70 MMHG | BODY MASS INDEX: 40.99 KG/M2

## 2023-03-27 DIAGNOSIS — I10 PRIMARY HYPERTENSION: Primary | ICD-10-CM

## 2023-03-27 DIAGNOSIS — E78.5 HYPERLIPIDEMIA, UNSPECIFIED HYPERLIPIDEMIA TYPE: ICD-10-CM

## 2023-03-27 PROCEDURE — 99213 OFFICE O/P EST LOW 20 MIN: CPT | Performed by: PHYSICIAN ASSISTANT

## 2023-03-27 PROCEDURE — 3078F DIAST BP <80 MM HG: CPT | Performed by: PHYSICIAN ASSISTANT

## 2023-03-27 PROCEDURE — 3075F SYST BP GE 130 - 139MM HG: CPT | Performed by: PHYSICIAN ASSISTANT

## 2023-03-27 NOTE — PROGRESS NOTES
Problem list     Subjective   Lacey Lui is a 68 y.o. female     Chief Complaint   Patient presents with   • Follow-up     Cath f/u    Problem List:  1. Chest Pain  1.1 stress test 1/13/2023-small, moderate dense reversible defect involving the distal anteroseptal wall and apex compatible with ischemia, EF 52% with septal hypokinesis  1. C March 2023 with no significant CAD documented and normal systolic fxn  2. Hypertension  3. Hyperlipidemia   4. Shortness of breath  5. Diabetes mellitus II   6. Hypothyroidism       HPI    Patient is a 68-year-old female who presents to the office to be evaluated.  Patient has done remarkably well.  She had catheterization with no significant disease.  No complaints of chest pain or pressure.  No shortness of breath.  No PND orthopnea.    No complaints of palpitations or dysrhythmic symptoms.  Otherwise, patient is doing remarkably well.      Current Outpatient Medications on File Prior to Visit   Medication Sig Dispense Refill   • aspirin 81 MG EC tablet Take 1 tablet by mouth Daily. 90 tablet 3   • atorvastatin (LIPITOR) 10 MG tablet Take 1 tablet by mouth Daily.     • busPIRone (BUSPAR) 10 MG tablet Take 1 tablet by mouth 2 (Two) Times a Day.     • cetirizine (zyrTEC) 5 MG tablet Take 1 tablet by mouth Daily.     • Dulaglutide (TRULICITY SC) Inject 0.75 mg under the skin into the appropriate area as directed 1 (One) Time Per Week.     • hydroCHLOROthiazide (HYDRODIURIL) 25 MG tablet Take 1 tablet by mouth Every Morning.     • levothyroxine (SYNTHROID, LEVOTHROID) 25 MCG tablet Take 1 tablet by mouth Daily. on an empty stomach     • losartan (Cozaar) 50 MG tablet Take 1 tablet by mouth Daily. 90 tablet 3   • nitroglycerin (NITROSTAT) 0.4 MG SL tablet 1 under the tongue as needed for angina, may repeat q5mins for up three doses 30 tablet 5   • omeprazole (priLOSEC) 40 MG capsule Take 1 capsule by mouth Daily.     • oxybutynin XL (DITROPAN XL) 15 MG 24 hr tablet Take 1  tablet by mouth Daily.     • propranolol (INDERAL) 60 MG tablet Take 2 tablets by mouth Daily.     • vitamin D (ERGOCALCIFEROL) 1.25 MG (68712 UT) capsule capsule Take 1 capsule by mouth 1 (One) Time Per Week. Sunday       No current facility-administered medications on file prior to visit.       Sulfa antibiotics    Past Medical History:   Diagnosis Date   • Bronchitis    • COVID-19     09/2021   • Diabetes mellitus (HCC)    • Hyperlipidemia    • Hypertension    • Loss of muscle tone of bladder    • Vitamin A deficiency        Social History     Socioeconomic History   • Marital status:    Tobacco Use   • Smoking status: Never   • Smokeless tobacco: Never   Vaping Use   • Vaping Use: Never used   Substance and Sexual Activity   • Alcohol use: Never   • Drug use: Defer   • Sexual activity: Defer       Family History   Problem Relation Age of Onset   • Heart disease Mother    • Hypertension Mother    • Heart attack Mother    • Hypertension Father    • Prostate cancer Father    • Skin cancer Father    • Glaucoma Father        Review of Systems   Constitutional: Negative for appetite change, chills and fever.   HENT: Negative.  Negative for dental problem, drooling, ear discharge, ear pain, facial swelling, sneezing, sore throat and tinnitus.    Eyes: Negative for photophobia, pain, redness, itching and visual disturbance.   Respiratory: Negative for cough, choking and wheezing.    Cardiovascular: Negative for chest pain, palpitations and leg swelling.   Gastrointestinal: Negative for blood in stool, constipation and diarrhea.   Genitourinary: Negative.  Negative for difficulty urinating.   Musculoskeletal: Negative for arthralgias, back pain and neck pain.   Skin: Negative for rash and wound.   Neurological: Negative for dizziness, weakness, light-headedness and numbness.   Psychiatric/Behavioral: Positive for sleep disturbance.       Objective   Vitals:    03/27/23 0857   BP: 130/70   Pulse: 80   SpO2: 94%  "  Weight: 94.7 kg (208 lb 12.8 oz)   Height: 152.4 cm (60\")      /70   Pulse 80   Ht 152.4 cm (60\")   Wt 94.7 kg (208 lb 12.8 oz)   SpO2 94%   BMI 40.78 kg/m²     Lab Results (most recent)     None          Physical Exam  Vitals and nursing note reviewed.   Constitutional:       General: She is not in acute distress.     Appearance: Normal appearance. She is well-developed.   HENT:      Head: Normocephalic and atraumatic.   Eyes:      General: No scleral icterus.        Right eye: No discharge.         Left eye: No discharge.      Conjunctiva/sclera: Conjunctivae normal.   Neck:      Vascular: No carotid bruit.   Cardiovascular:      Rate and Rhythm: Normal rate and regular rhythm.      Heart sounds: Normal heart sounds. No murmur heard.    No friction rub. No gallop.   Pulmonary:      Effort: Pulmonary effort is normal. No respiratory distress.      Breath sounds: Normal breath sounds. No wheezing or rales.   Chest:      Chest wall: No tenderness.   Musculoskeletal:      Right lower leg: No edema.      Left lower leg: No edema.   Skin:     General: Skin is warm and dry.      Coloration: Skin is not pale.      Findings: No erythema or rash.   Neurological:      Mental Status: She is alert and oriented to person, place, and time.      Cranial Nerves: No cranial nerve deficit.   Psychiatric:         Behavior: Behavior normal.         Procedure   Procedures       Assessment & Plan     Problems Addressed this Visit        Cardiac and Vasculature    Hyperlipidemia    Primary hypertension - Primary   Diagnoses       Codes Comments    Primary hypertension    -  Primary ICD-10-CM: I10  ICD-9-CM: 401.9     Hyperlipidemia, unspecified hyperlipidemia type     ICD-10-CM: E78.5  ICD-9-CM: 272.4         Recommendations  1) Patient doing remarkably well.  Patient has no ischemic symptoms.  For now, we will continue to monitor the patient.  We will continue risk factor modification.    2.  Patient with baseline " hypertension doing well on medical therapy and I will continue.    3.  Patient with dyslipidemia currently on statin therapy.  Labs are monitored by primary.  We will continue the same and see her back for follow-up in a year or sooner as symptoms discussed.  Follow-up with primary as scheduled.             Lacey Lui  reports that she has never smoked. She has never used smokeless tobacco..           Advance Care Planning   ACP discussion was declined by the patient. Patient does not have an advance directive, declines further assistance.        Electronically signed by:

## 2023-12-21 DIAGNOSIS — I10 PRIMARY HYPERTENSION: ICD-10-CM

## 2023-12-21 RX ORDER — LOSARTAN POTASSIUM 50 MG/1
50 TABLET ORAL DAILY
Qty: 90 TABLET | Refills: 1 | Status: SHIPPED | OUTPATIENT
Start: 2023-12-21

## 2023-12-21 NOTE — TELEPHONE ENCOUNTER
Caller: Lacey Lui    Relationship: Self    Best call back number: [phone]    Requested Prescriptions:   Requested Prescriptions     Pending Prescriptions Disp Refills    losartan (Cozaar) 50 MG tablet 90 tablet 3     Sig: Take 1 tablet by mouth Daily.        Pharmacy where request should be sent: [facility] [address] [place] [address] [phone] [facility] [phone] FX     Last office visit with prescribing clinician: 3/27/2023   Last telemedicine visit with prescribing clinician: Visit date not found   Next office visit with prescribing clinician: 3/27/2024     Additional details provided by patient: TAKING LAST DOSE OF THIS MEDICATION TODAY 12/21/23    Does the patient have less than a 3 day supply:  [x] Yes  [] No    Would you like a call back once the refill request has been completed: [x] Yes [] No    If the office needs to give you a call back, can they leave a voicemail: [x] Yes [] No    Ilan Madsen Rep   12/21/23 09:54 EST

## 2024-03-27 ENCOUNTER — OFFICE VISIT (OUTPATIENT)
Dept: CARDIOLOGY | Facility: CLINIC | Age: 69
End: 2024-03-27
Payer: MEDICARE

## 2024-03-27 VITALS
OXYGEN SATURATION: 97 % | SYSTOLIC BLOOD PRESSURE: 139 MMHG | WEIGHT: 218 LBS | BODY MASS INDEX: 42.58 KG/M2 | HEART RATE: 70 BPM | DIASTOLIC BLOOD PRESSURE: 88 MMHG

## 2024-03-27 DIAGNOSIS — I10 PRIMARY HYPERTENSION: Primary | ICD-10-CM

## 2024-03-27 DIAGNOSIS — E78.5 HYPERLIPIDEMIA, UNSPECIFIED HYPERLIPIDEMIA TYPE: ICD-10-CM

## 2024-03-27 DIAGNOSIS — R60.0 LOWER EXTREMITY EDEMA: ICD-10-CM

## 2024-03-27 PROCEDURE — 93000 ELECTROCARDIOGRAM COMPLETE: CPT | Performed by: PHYSICIAN ASSISTANT

## 2024-03-27 PROCEDURE — 3079F DIAST BP 80-89 MM HG: CPT | Performed by: PHYSICIAN ASSISTANT

## 2024-03-27 PROCEDURE — 3075F SYST BP GE 130 - 139MM HG: CPT | Performed by: PHYSICIAN ASSISTANT

## 2024-03-27 PROCEDURE — 99213 OFFICE O/P EST LOW 20 MIN: CPT | Performed by: PHYSICIAN ASSISTANT

## 2024-03-27 NOTE — LETTER
March 27, 2024       No Recipients    Patient: Lacey Lui   YOB: 1955   Date of Visit: 3/27/2024       Dear JUNIOR Montano    Lacey Lui was in my office today. Below is a copy of my note.    If you have questions, please do not hesitate to call me. I look forward to following Lacey along with you.         Sincerely,        JOHNATHAN Chaney        CC:   No Recipients    Problem list     Subjective  Lacey Lui is a 69 y.o. female     Chief Complaint   Patient presents with   • Follow-up     YEARLY     Problem List:  1. Chest Pain  1.1 stress test 1/13/2023-small, moderate dense reversible defect involving the distal anteroseptal wall and apex compatible with ischemia, EF 52% with septal hypokinesis  1. LHC March 2023 with no significant CAD documented and normal systolic fxn  2. Hypertension  3. Hyperlipidemia   4. Shortness of breath  5. Diabetes mellitus II   6. Hypothyroidism      HPI    Patient is a 69-year-old female who presents back to the office for follow-up.  Patient has felt remarkably well.  She has no chest pain or pressure.  No shortness of breath.  No complaints of PND or orthopnea.    She does not describe palpitating nor does she complain of dysrhythmic symptoms.  She feels remarkably well at this time.      Current Outpatient Medications on File Prior to Visit   Medication Sig Dispense Refill   • atorvastatin (LIPITOR) 10 MG tablet Take 1 tablet by mouth Daily.     • busPIRone (BUSPAR) 10 MG tablet Take 1 tablet by mouth 2 (Two) Times a Day.     • cetirizine (zyrTEC) 5 MG tablet Take 1 tablet by mouth Daily.     • hydroCHLOROthiazide (HYDRODIURIL) 25 MG tablet Take 1 tablet by mouth Every Morning.     • levothyroxine (SYNTHROID, LEVOTHROID) 25 MCG tablet Take 1 tablet by mouth Daily. on an empty stomach     • losartan (Cozaar) 50 MG tablet Take 1 tablet by mouth Daily. 90 tablet 1   • nitroglycerin (NITROSTAT) 0.4 MG SL tablet 1 under the tongue as  needed for angina, may repeat q5mins for up three doses 30 tablet 5   • omeprazole (priLOSEC) 40 MG capsule Take 1 capsule by mouth Daily.     • oxybutynin XL (DITROPAN XL) 15 MG 24 hr tablet Take 1 tablet by mouth Daily.     • propranolol (INDERAL) 60 MG tablet Take 2 tablets by mouth Daily.     • vitamin D (ERGOCALCIFEROL) 1.25 MG (83628 UT) capsule capsule Take 1 capsule by mouth 1 (One) Time Per Week. Sunday     • [DISCONTINUED] aspirin 81 MG EC tablet Take 1 tablet by mouth Daily. 90 tablet 3   • [DISCONTINUED] Dulaglutide (TRULICITY SC) Inject 0.75 mg under the skin into the appropriate area as directed 1 (One) Time Per Week.       No current facility-administered medications on file prior to visit.       Sulfa antibiotics    Past Medical History:   Diagnosis Date   • Bronchitis    • COVID-19     09/2021   • Diabetes mellitus    • Hyperlipidemia    • Hypertension    • Loss of muscle tone of bladder    • Vitamin A deficiency        Social History     Socioeconomic History   • Marital status:    Tobacco Use   • Smoking status: Never   • Smokeless tobacco: Never   Vaping Use   • Vaping status: Never Used   Substance and Sexual Activity   • Alcohol use: Never   • Drug use: Defer   • Sexual activity: Defer       Family History   Problem Relation Age of Onset   • Heart disease Mother    • Hypertension Mother    • Heart attack Mother    • Hypertension Father    • Prostate cancer Father    • Skin cancer Father    • Glaucoma Father        Review of Systems   Constitutional: Negative.  Negative for activity change, appetite change, chills, fatigue and fever.   HENT: Negative.  Negative for congestion and sinus pain.    Eyes: Negative.  Negative for visual disturbance.   Respiratory: Negative.  Negative for apnea, cough, chest tightness, shortness of breath and wheezing.    Cardiovascular: Negative.  Negative for chest pain, palpitations and leg swelling.   Gastrointestinal: Negative.  Negative for blood in stool.    Endocrine: Negative.  Negative for cold intolerance and heat intolerance.   Genitourinary:  Negative for hematuria.   Musculoskeletal: Negative.  Negative for gait problem.   Skin: Negative.  Negative for color change, rash and wound.   Allergic/Immunologic: Negative.  Negative for environmental allergies and food allergies.   Neurological:  Negative for dizziness, syncope, weakness, light-headedness, numbness and headaches.   Hematological: Negative.  Does not bruise/bleed easily.   Psychiatric/Behavioral: Negative.  Negative for sleep disturbance.        Objective  Vitals:    03/27/24 0932   BP: 139/88   BP Location: Left arm   Patient Position: Sitting   Cuff Size: Adult   Pulse: 70   SpO2: 97%   Weight: 98.9 kg (218 lb)      /88 (BP Location: Left arm, Patient Position: Sitting, Cuff Size: Adult)   Pulse 70   Wt 98.9 kg (218 lb)   SpO2 97%   BMI 42.58 kg/m²     Lab Results (most recent)       None            Physical Exam  Vitals and nursing note reviewed.   Constitutional:       General: She is not in acute distress.     Appearance: Normal appearance. She is well-developed.   HENT:      Head: Normocephalic and atraumatic.   Eyes:      General: No scleral icterus.        Right eye: No discharge.         Left eye: No discharge.      Conjunctiva/sclera: Conjunctivae normal.   Neck:      Vascular: No carotid bruit.   Cardiovascular:      Rate and Rhythm: Normal rate and regular rhythm.      Heart sounds: Normal heart sounds. No murmur heard.     No friction rub. No gallop.   Pulmonary:      Effort: Pulmonary effort is normal. No respiratory distress.      Breath sounds: Normal breath sounds. No wheezing or rales.   Chest:      Chest wall: No tenderness.   Musculoskeletal:      Right lower leg: No edema.      Left lower leg: No edema.   Skin:     General: Skin is warm and dry.      Coloration: Skin is not pale.      Findings: No erythema or rash.   Neurological:      Mental Status: She is alert and  oriented to person, place, and time.      Cranial Nerves: No cranial nerve deficit.   Psychiatric:         Behavior: Behavior normal.         Procedure    ECG 12 Lead    Date/Time: 3/27/2024 10:31 AM  Performed by: Sal George PA    Authorized by: Sal George PA  Comparison: compared with previous ECG from 8/24/2022  Similar to previous ECG  Comments: EKG demonstrates sinus rhythm at 70 bpm with low voltage and no acute ST changes             Assessment & Plan    Problems Addressed this Visit          Cardiac and Vasculature    Hyperlipidemia    Primary hypertension - Primary       Symptoms and Signs    Lower extremity edema     Diagnoses         Codes Comments    Primary hypertension    -  Primary ICD-10-CM: I10  ICD-9-CM: 401.9     Hyperlipidemia, unspecified hyperlipidemia type     ICD-10-CM: E78.5  ICD-9-CM: 272.4     Lower extremity edema     ICD-10-CM: R60.0  ICD-9-CM: 782.3             Recommendation  1.  Patient is a 69-year-old female presenting back to the office for routine cardiac assessment.  She has done remarkably well without any angina, failure, or arrhythmia.  For now, we will monitor.    2.  Patient with baseline hypertension doing well on medical therapy.  I will make no change.    3.  Patient dyslipidemia on statin therapy.  She has labs routinely followed by primary.    4.  Patient with occasional lower extremity edema on diuretic therapy.  It is currently manageable and she feels well.  We will make no changes but see her back in a year or sooner if needed.  Follow-up with primary as scheduled.         Patient brought in medicine list to appointment, it's been reviewed with patient and med list was updated in the chart.      Advance Care Planning  ACP discussion was declined by the patient. Patient does not have an advance directive, information provided.      Electronically signed by:

## 2025-03-27 ENCOUNTER — OFFICE VISIT (OUTPATIENT)
Dept: CARDIOLOGY | Facility: CLINIC | Age: 70
End: 2025-03-27
Payer: MEDICARE

## 2025-03-27 VITALS
WEIGHT: 217 LBS | HEIGHT: 61 IN | BODY MASS INDEX: 40.97 KG/M2 | OXYGEN SATURATION: 94 % | HEART RATE: 78 BPM | SYSTOLIC BLOOD PRESSURE: 127 MMHG | DIASTOLIC BLOOD PRESSURE: 78 MMHG

## 2025-03-27 DIAGNOSIS — E78.5 HYPERLIPIDEMIA, UNSPECIFIED HYPERLIPIDEMIA TYPE: ICD-10-CM

## 2025-03-27 DIAGNOSIS — I10 PRIMARY HYPERTENSION: Primary | ICD-10-CM

## 2025-03-27 PROCEDURE — 99213 OFFICE O/P EST LOW 20 MIN: CPT | Performed by: PHYSICIAN ASSISTANT

## 2025-03-27 PROCEDURE — 3078F DIAST BP <80 MM HG: CPT | Performed by: PHYSICIAN ASSISTANT

## 2025-03-27 PROCEDURE — 3074F SYST BP LT 130 MM HG: CPT | Performed by: PHYSICIAN ASSISTANT

## 2025-03-27 NOTE — PROGRESS NOTES
Problem list     Subjective   Lacey Lui is a 70 y.o. female     Chief Complaint   Patient presents with    1 year follow up     Primary HTN     Problem List:  1. Chest Pain  1.1 stress test 1/13/2023-small, moderate dense reversible defect involving the distal anteroseptal wall and apex compatible with ischemia, EF 52% with septal hypokinesis  1. LHC March 2023 with no significant CAD documented and normal systolic fxn  2. Hypertension  3. Hyperlipidemia   4. Shortness of breath  5. Diabetes mellitus II   6. Hypothyroidism     HPI    Patient is a 70-year-old male presenting back to the office for routine cardiac assessment.  In March 2023, she underwent a cardiac catheterization with no significant coronary disease.  She has normal systolic function.  She does have risk factors to include hypertension, dyslipidemia and diabetes mellitus.    She feels remarkably well without any discomfort.  She has no chest pain or pressure at all.  No complaints of any dyspnea.  She does not describe PND, orthopnea, or edema.    Patient does not palpitate nor does she complain of dysrhythmic symptoms.  Patient is stable otherwise.      Current Outpatient Medications on File Prior to Visit   Medication Sig Dispense Refill    atorvastatin (LIPITOR) 10 MG tablet Take 1 tablet by mouth Daily.      busPIRone (BUSPAR) 10 MG tablet Take 1 tablet by mouth 2 (Two) Times a Day.      cetirizine (zyrTEC) 5 MG tablet Take 1 tablet by mouth Daily.      hydroCHLOROthiazide (HYDRODIURIL) 25 MG tablet Take 1 tablet by mouth Every Morning.      levothyroxine (SYNTHROID, LEVOTHROID) 25 MCG tablet Take 1 tablet by mouth Daily. on an empty stomach      losartan (Cozaar) 50 MG tablet Take 1 tablet by mouth Daily. 90 tablet 1    metFORMIN (GLUCOPHAGE) 500 MG tablet Take 1 tablet by mouth Daily With Breakfast.      nitroglycerin (NITROSTAT) 0.4 MG SL tablet 1 under the tongue as needed for angina, may repeat q5mins for up three doses 30 tablet 5     omeprazole (priLOSEC) 40 MG capsule Take 1 capsule by mouth Daily.      oxybutynin XL (DITROPAN XL) 15 MG 24 hr tablet Take 1 tablet by mouth Daily.      propranolol (INDERAL) 60 MG tablet Take 1 tablet by mouth 2 (Two) Times a Day.      vitamin D (ERGOCALCIFEROL) 1.25 MG (55673 UT) capsule capsule Take 1 capsule by mouth 1 (One) Time Per Week. Sunday       No current facility-administered medications on file prior to visit.       Sulfa antibiotics    Past Medical History:   Diagnosis Date    Bronchitis     COVID-19     09/2021    Diabetes mellitus     Hyperlipidemia     Hypertension     Loss of muscle tone of bladder     Vitamin A deficiency        Social History     Socioeconomic History    Marital status:    Tobacco Use    Smoking status: Never    Smokeless tobacco: Never   Vaping Use    Vaping status: Never Used   Substance and Sexual Activity    Alcohol use: Never    Drug use: Defer    Sexual activity: Defer       Family History   Problem Relation Age of Onset    Heart disease Mother     Hypertension Mother     Heart attack Mother     Hypertension Father     Prostate cancer Father     Skin cancer Father     Glaucoma Father        Review of Systems   Constitutional: Negative.  Negative for activity change, appetite change, chills, fatigue and fever.   HENT: Negative.  Negative for congestion, sinus pressure and sinus pain.    Eyes: Negative.  Negative for visual disturbance.   Respiratory: Negative.  Negative for apnea, cough, chest tightness, shortness of breath and wheezing.    Cardiovascular: Negative.  Negative for chest pain, palpitations and leg swelling.   Gastrointestinal: Negative.  Negative for blood in stool.   Endocrine: Negative.  Negative for cold intolerance and heat intolerance.   Genitourinary: Negative.  Negative for hematuria.   Musculoskeletal: Negative.  Negative for gait problem.   Skin: Negative.  Negative for color change, rash and wound.   Allergic/Immunologic: Negative.   "Negative for environmental allergies and food allergies.   Neurological:  Negative for dizziness, syncope, weakness, numbness and headaches.   Hematological: Negative.  Does not bruise/bleed easily.   Psychiatric/Behavioral: Negative.  Negative for sleep disturbance.        Objective   Vitals:    03/27/25 0914   BP: 127/78   BP Location: Left arm   Patient Position: Sitting   Cuff Size: Adult   Pulse: 78   SpO2: 94%   Weight: 98.4 kg (217 lb)   Height: 154.9 cm (61\")      /78 (BP Location: Left arm, Patient Position: Sitting, Cuff Size: Adult)   Pulse 78   Ht 154.9 cm (61\")   Wt 98.4 kg (217 lb)   SpO2 94%   BMI 41.00 kg/m²     Lab Results (most recent)       None            Physical Exam  Vitals and nursing note reviewed.   Constitutional:       General: She is not in acute distress.     Appearance: Normal appearance. She is well-developed.   HENT:      Head: Normocephalic and atraumatic.   Eyes:      General: No scleral icterus.        Right eye: No discharge.         Left eye: No discharge.      Conjunctiva/sclera: Conjunctivae normal.   Neck:      Vascular: No carotid bruit.   Cardiovascular:      Rate and Rhythm: Normal rate and regular rhythm.      Heart sounds: Normal heart sounds. No murmur heard.     No friction rub. No gallop.   Pulmonary:      Effort: Pulmonary effort is normal. No respiratory distress.      Breath sounds: Normal breath sounds. No wheezing or rales.   Chest:      Chest wall: No tenderness.   Musculoskeletal:      Right lower leg: No edema.      Left lower leg: No edema.   Skin:     General: Skin is warm and dry.      Coloration: Skin is not pale.      Findings: No erythema or rash.   Neurological:      Mental Status: She is alert and oriented to person, place, and time.      Cranial Nerves: No cranial nerve deficit.   Psychiatric:         Behavior: Behavior normal.         Procedure   Procedures       Assessment & Plan     Problems Addressed this Visit          Cardiac and " Vasculature    Hyperlipidemia    Primary hypertension - Primary     Diagnoses         Codes Comments      Primary hypertension    -  Primary ICD-10-CM: I10  ICD-9-CM: 401.9       Hyperlipidemia, unspecified hyperlipidemia type     ICD-10-CM: E78.5  ICD-9-CM: 272.4           Recommendations  1.  Patient is a 70-year-old female presenting for evaluation with baseline hypertension doing well.  She has done well on medical regimen.  I will make no changes.    2.  Patient is on a atorvastatin 10 mg for dyslipidemia.  Patient has labs monitored by primary.    3.  For now, she is doing well.  She has normal systolic function with no obstructive coronary disease.  We can see her back for follow-up in a year or sooner if needed.  Follow-up with primary as scheduled.             Lacey Lui  reports that she has never smoked. She has never used smokeless tobacco.     Patient did not bring med list or medicine bottles to appointment, med list has been reviewed and updated based on patient's knowledge of their meds.      Advance Care Planning   ACP discussion was declined by the patient. Patient does not have an advance directive, declines further assistance.        Electronically signed by:

## 2025-03-27 NOTE — LETTER
March 27, 2025     JUNIOR Montano  [address]  [place] [address]  [facility] [address]    Patient: Lacey Lui   YOB: 1955   Date of Visit: 3/27/2025       Dear JUNIOR Montano    Lacey Lui was in my office today. Below is a copy of my note.    If you have questions, please do not hesitate to call me. I look forward to following Lacey along with you.         Sincerely,        JOHNATHAN Chaney        CC: No Recipients    Problem list     Subjective  Lacey Lui is a 70 y.o. female     Chief Complaint   Patient presents with   • 1 year follow up     Primary HTN     Problem List:  1. Chest Pain  1.1 stress test 1/13/2023-small, moderate dense reversible defect involving the distal anteroseptal wall and apex compatible with ischemia, EF 52% with septal hypokinesis  1. LHC March 2023 with no significant CAD documented and normal systolic fxn  2. Hypertension  3. Hyperlipidemia   4. Shortness of breath  5. Diabetes mellitus II   6. Hypothyroidism     HPI    Patient is a 70-year-old male presenting back to the office for routine cardiac assessment.  In March 2023, she underwent a cardiac catheterization with no significant coronary disease.  She has normal systolic function.  She does have risk factors to include hypertension, dyslipidemia and diabetes mellitus.    She feels remarkably well without any discomfort.  She has no chest pain or pressure at all.  No complaints of any dyspnea.  She does not describe PND, orthopnea, or edema.    Patient does not palpitate nor does she complain of dysrhythmic symptoms.  Patient is stable otherwise.      Current Outpatient Medications on File Prior to Visit   Medication Sig Dispense Refill   • atorvastatin (LIPITOR) 10 MG tablet Take 1 tablet by mouth Daily.     • busPIRone (BUSPAR) 10 MG tablet Take 1 tablet by mouth 2 (Two) Times a Day.     • cetirizine (zyrTEC) 5 MG tablet Take 1 tablet by mouth Daily.     • hydroCHLOROthiazide  (HYDRODIURIL) 25 MG tablet Take 1 tablet by mouth Every Morning.     • levothyroxine (SYNTHROID, LEVOTHROID) 25 MCG tablet Take 1 tablet by mouth Daily. on an empty stomach     • losartan (Cozaar) 50 MG tablet Take 1 tablet by mouth Daily. 90 tablet 1   • metFORMIN (GLUCOPHAGE) 500 MG tablet Take 1 tablet by mouth Daily With Breakfast.     • nitroglycerin (NITROSTAT) 0.4 MG SL tablet 1 under the tongue as needed for angina, may repeat q5mins for up three doses 30 tablet 5   • omeprazole (priLOSEC) 40 MG capsule Take 1 capsule by mouth Daily.     • oxybutynin XL (DITROPAN XL) 15 MG 24 hr tablet Take 1 tablet by mouth Daily.     • propranolol (INDERAL) 60 MG tablet Take 1 tablet by mouth 2 (Two) Times a Day.     • vitamin D (ERGOCALCIFEROL) 1.25 MG (17221 UT) capsule capsule Take 1 capsule by mouth 1 (One) Time Per Week. Sunday       No current facility-administered medications on file prior to visit.       Sulfa antibiotics    Past Medical History:   Diagnosis Date   • Bronchitis    • COVID-19     09/2021   • Diabetes mellitus    • Hyperlipidemia    • Hypertension    • Loss of muscle tone of bladder    • Vitamin A deficiency        Social History     Socioeconomic History   • Marital status:    Tobacco Use   • Smoking status: Never   • Smokeless tobacco: Never   Vaping Use   • Vaping status: Never Used   Substance and Sexual Activity   • Alcohol use: Never   • Drug use: Defer   • Sexual activity: Defer       Family History   Problem Relation Age of Onset   • Heart disease Mother    • Hypertension Mother    • Heart attack Mother    • Hypertension Father    • Prostate cancer Father    • Skin cancer Father    • Glaucoma Father        Review of Systems   Constitutional: Negative.  Negative for activity change, appetite change, chills, fatigue and fever.   HENT: Negative.  Negative for congestion, sinus pressure and sinus pain.    Eyes: Negative.  Negative for visual disturbance.   Respiratory: Negative.  Negative  "for apnea, cough, chest tightness, shortness of breath and wheezing.    Cardiovascular: Negative.  Negative for chest pain, palpitations and leg swelling.   Gastrointestinal: Negative.  Negative for blood in stool.   Endocrine: Negative.  Negative for cold intolerance and heat intolerance.   Genitourinary: Negative.  Negative for hematuria.   Musculoskeletal: Negative.  Negative for gait problem.   Skin: Negative.  Negative for color change, rash and wound.   Allergic/Immunologic: Negative.  Negative for environmental allergies and food allergies.   Neurological:  Negative for dizziness, syncope, weakness, numbness and headaches.   Hematological: Negative.  Does not bruise/bleed easily.   Psychiatric/Behavioral: Negative.  Negative for sleep disturbance.        Objective  Vitals:    03/27/25 0914   BP: 127/78   BP Location: Left arm   Patient Position: Sitting   Cuff Size: Adult   Pulse: 78   SpO2: 94%   Weight: 98.4 kg (217 lb)   Height: 154.9 cm (61\")      /78 (BP Location: Left arm, Patient Position: Sitting, Cuff Size: Adult)   Pulse 78   Ht 154.9 cm (61\")   Wt 98.4 kg (217 lb)   SpO2 94%   BMI 41.00 kg/m²     Lab Results (most recent)       None            Physical Exam  Vitals and nursing note reviewed.   Constitutional:       General: She is not in acute distress.     Appearance: Normal appearance. She is well-developed.   HENT:      Head: Normocephalic and atraumatic.   Eyes:      General: No scleral icterus.        Right eye: No discharge.         Left eye: No discharge.      Conjunctiva/sclera: Conjunctivae normal.   Neck:      Vascular: No carotid bruit.   Cardiovascular:      Rate and Rhythm: Normal rate and regular rhythm.      Heart sounds: Normal heart sounds. No murmur heard.     No friction rub. No gallop.   Pulmonary:      Effort: Pulmonary effort is normal. No respiratory distress.      Breath sounds: Normal breath sounds. No wheezing or rales.   Chest:      Chest wall: No tenderness. "   Musculoskeletal:      Right lower leg: No edema.      Left lower leg: No edema.   Skin:     General: Skin is warm and dry.      Coloration: Skin is not pale.      Findings: No erythema or rash.   Neurological:      Mental Status: She is alert and oriented to person, place, and time.      Cranial Nerves: No cranial nerve deficit.   Psychiatric:         Behavior: Behavior normal.         Procedure  Procedures       Assessment & Plan    Problems Addressed this Visit          Cardiac and Vasculature    Hyperlipidemia    Primary hypertension - Primary     Diagnoses         Codes Comments      Primary hypertension    -  Primary ICD-10-CM: I10  ICD-9-CM: 401.9       Hyperlipidemia, unspecified hyperlipidemia type     ICD-10-CM: E78.5  ICD-9-CM: 272.4           Recommendations  1.  Patient is a 70-year-old female presenting for evaluation with baseline hypertension doing well.  She has done well on medical regimen.  I will make no changes.    2.  Patient is on a atorvastatin 10 mg for dyslipidemia.  Patient has labs monitored by primary.    3.  For now, she is doing well.  She has normal systolic function with no obstructive coronary disease.  We can see her back for follow-up in a year or sooner if needed.  Follow-up with primary as scheduled.             Lacey Lui  reports that she has never smoked. She has never used smokeless tobacco.     Patient did not bring med list or medicine bottles to appointment, med list has been reviewed and updated based on patient's knowledge of their meds.      Advance Care Planning  ACP discussion was declined by the patient. Patient does not have an advance directive, declines further assistance.        Electronically signed by: